# Patient Record
Sex: MALE | Race: WHITE | Employment: UNEMPLOYED | URBAN - METROPOLITAN AREA
[De-identification: names, ages, dates, MRNs, and addresses within clinical notes are randomized per-mention and may not be internally consistent; named-entity substitution may affect disease eponyms.]

---

## 2018-05-23 ENCOUNTER — TELEPHONE (OUTPATIENT)
Dept: NEUROLOGY | Age: 23
End: 2018-05-23

## 2018-05-23 NOTE — TELEPHONE ENCOUNTER
----- Message from [facility] sent at 5/23/2018 11:35 AM EDT -----  Regarding: Dr. Jeff Bryan  Pt's mother Marvin Jaimes called to speak with the doctor so that the patient can be referred to a doctor.  Pt best contact number  [phone]

## 2018-05-24 NOTE — TELEPHONE ENCOUNTER
Spoke with mother. Gave name of [facility] and Dr. Rosas Sep there as I do not do psychotherapy at this time and he doesn't need neurology but rather psychiatry.